# Patient Record
Sex: FEMALE | URBAN - METROPOLITAN AREA
[De-identification: names, ages, dates, MRNs, and addresses within clinical notes are randomized per-mention and may not be internally consistent; named-entity substitution may affect disease eponyms.]

---

## 2023-04-18 ENCOUNTER — CLAIMS CREATED FROM THE CLAIM WINDOW (OUTPATIENT)
Dept: URBAN - METROPOLITAN AREA MEDICAL CENTER 9 | Facility: MEDICAL CENTER | Age: 43
End: 2023-04-18
Payer: MEDICARE

## 2023-04-18 DIAGNOSIS — D50.0 IRON DEFICIENCY ANEMIA SECONDARY TO BLOOD LOSS (CHRONIC): ICD-10-CM

## 2023-04-18 DIAGNOSIS — K92.1 MELENA: ICD-10-CM

## 2023-04-18 DIAGNOSIS — D62 ABLA (ACUTE BLOOD LOSS ANEMIA): ICD-10-CM

## 2023-04-18 DIAGNOSIS — T39.395D: ICD-10-CM

## 2023-04-18 DIAGNOSIS — Z79.1 ENCNTR LONG-TERM NSAID USE: ICD-10-CM

## 2023-04-18 DIAGNOSIS — R11.2 NAUSEA WITH VOMITING, UNSPECIFIED: ICD-10-CM

## 2023-04-18 DIAGNOSIS — Z87.19 ESOPHAGEAL FOOD BOLUS: ICD-10-CM

## 2023-04-18 PROCEDURE — G8427 DOCREV CUR MEDS BY ELIG CLIN: HCPCS | Performed by: PHYSICIAN ASSISTANT

## 2023-04-18 PROCEDURE — 99253 IP/OBS CNSLTJ NEW/EST LOW 45: CPT | Performed by: PHYSICIAN ASSISTANT

## 2023-04-18 PROCEDURE — 99221 1ST HOSP IP/OBS SF/LOW 40: CPT | Performed by: PHYSICIAN ASSISTANT

## 2023-04-19 ENCOUNTER — CLAIMS CREATED FROM THE CLAIM WINDOW (OUTPATIENT)
Dept: URBAN - METROPOLITAN AREA MEDICAL CENTER 9 | Facility: MEDICAL CENTER | Age: 43
End: 2023-04-19

## 2023-04-19 ENCOUNTER — CLAIMS CREATED FROM THE CLAIM WINDOW (OUTPATIENT)
Dept: URBAN - METROPOLITAN AREA MEDICAL CENTER 9 | Facility: MEDICAL CENTER | Age: 43
End: 2023-04-19
Payer: MEDICARE

## 2023-04-19 DIAGNOSIS — R11.2 NAUSEA WITH VOMITING, UNSPECIFIED: ICD-10-CM

## 2023-04-19 DIAGNOSIS — D50.0 IRON DEFICIENCY ANEMIA SECONDARY TO BLOOD LOSS (CHRONIC): ICD-10-CM

## 2023-04-19 DIAGNOSIS — K92.1 MELENA: ICD-10-CM

## 2023-04-19 DIAGNOSIS — Z87.19 ESOPHAGEAL FOOD BOLUS: ICD-10-CM

## 2023-04-19 DIAGNOSIS — K92.1 ACUTE MELENA: ICD-10-CM

## 2023-04-19 DIAGNOSIS — T39.395D: ICD-10-CM

## 2023-04-19 DIAGNOSIS — D62 ABLA (ACUTE BLOOD LOSS ANEMIA): ICD-10-CM

## 2023-04-19 DIAGNOSIS — Z79.1 ENCNTR LONG-TERM NSAID USE: ICD-10-CM

## 2023-04-19 PROCEDURE — 99253 IP/OBS CNSLTJ NEW/EST LOW 45: CPT | Performed by: PHYSICIAN ASSISTANT

## 2023-04-19 PROCEDURE — 99231 SBSQ HOSP IP/OBS SF/LOW 25: CPT | Performed by: PHYSICIAN ASSISTANT

## 2023-06-01 ENCOUNTER — OFFICE VISIT (OUTPATIENT)
Dept: URBAN - METROPOLITAN AREA CLINIC 40 | Facility: CLINIC | Age: 43
End: 2023-06-01

## 2023-06-06 ENCOUNTER — OFFICE VISIT (OUTPATIENT)
Dept: URBAN - METROPOLITAN AREA CLINIC 40 | Facility: CLINIC | Age: 43
End: 2023-06-06

## 2023-07-11 ENCOUNTER — DASHBOARD ENCOUNTERS (OUTPATIENT)
Age: 43
End: 2023-07-11

## 2023-07-11 ENCOUNTER — OFFICE VISIT (OUTPATIENT)
Dept: URBAN - METROPOLITAN AREA CLINIC 40 | Facility: CLINIC | Age: 43
End: 2023-07-11
Payer: MEDICARE

## 2023-07-11 VITALS
WEIGHT: 171.2 LBS | DIASTOLIC BLOOD PRESSURE: 82 MMHG | SYSTOLIC BLOOD PRESSURE: 138 MMHG | HEART RATE: 87 BPM | BODY MASS INDEX: 30.33 KG/M2 | HEIGHT: 63 IN | TEMPERATURE: 96.4 F

## 2023-07-11 DIAGNOSIS — K29.60 EROSIVE GASTRITIS: ICD-10-CM

## 2023-07-11 DIAGNOSIS — Z79.1 NSAID LONG-TERM USE: ICD-10-CM

## 2023-07-11 DIAGNOSIS — D64.89 ANEMIA DUE TO OTHER CAUSE: ICD-10-CM

## 2023-07-11 PROCEDURE — 99203 OFFICE O/P NEW LOW 30 MIN: CPT | Performed by: PHYSICIAN ASSISTANT

## 2023-07-11 RX ORDER — PANTOPRAZOLE SODIUM 40 MG/1
1 TABLET TABLET, DELAYED RELEASE ORAL TWICE A DAY
Qty: 60 TABLET | Refills: 3

## 2023-07-11 NOTE — HPI-TODAY'S VISIT:
Guillermina Anderson is a 42 year old female who was admitted to [facility] on 4/17/2023 for acute blood loss anemia. The patient with a known history of Anxiety, Migraine, GERD, Hiatal Hernia, Esophageal stenosis, S/P Dilation in 2019, Gastritis , and Long term use of NSAIDs. She presented to ED at [facility] with complains of extreme fatigue over the past week. She also reports nausea & vomiting over 3 days. She also endorsed melena. She was taking Goody's Powder, Ibuprofen, and Advil daily. She states that she takes Omeprazole 40 mg once daily since 2019. Last EGD in 2019. At admission, her Hgb 4.7, S/P 2 units of blood with appropriate response. She denies ETOH, Tobacco (Former), and Illicit drug abuse. She is not up to date with Flu or COVID vaccine.        Procedure: -- EGD with biopsy on 03/1220/2019 by Dr. Weeks noted benign-appearing esophageal stenosis.  Dilated.  Hiatal hernia.  Chronic gastritis.  Normal examined duodenum.  Biopsy with reactive gastropathy.  No H. pylori organisms.  No intestinal metaplasia. --EGD 4/18/23 by Dr. Sexton: Severe, erosive gastritis. No Hpylori per path. Focal activity in chronic gastritis. Normal esophagus and duodenum on examination.

## 2023-07-11 NOTE — HPI-TODAY'S VISIT:
Since last visit, she has been avoiding NSAIDs with the exception of 1 menstrual period where she took up to 8 ibuprofen over 2 days.  Denies any current rectal bleeding or melena.  Bowel habits normal.  Denies nausea, vomiting and has rare dysphagia.  She is taking pantoprazole, now down to 40 mg once daily from twice daily dosing after discharge from hospital.  She occasionally takes her sucralfate which she can remember.  No current abdominal pain.  Good appetite and weight stable.  She has been avoiding tobacco but admits to using vaping products.

## 2023-07-13 LAB
FERRITIN, SERUM: 13
HEMATOCRIT: 35.5
HEMOGLOBIN: 11.6
IRON BIND.CAP.(TIBC): 321
IRON SATURATION: 13
IRON: 42
MCH: 27.6
MCHC: 32.7
MCV: 84.5
MPV: 9.8
PLATELET COUNT: 219
RDW: 14.3
RED BLOOD CELL COUNT: 4.2
WHITE BLOOD CELL COUNT: 6.5

## 2023-11-09 ENCOUNTER — OFFICE VISIT (OUTPATIENT)
Dept: URBAN - METROPOLITAN AREA CLINIC 40 | Facility: CLINIC | Age: 43
End: 2023-11-09